# Patient Record
Sex: MALE | URBAN - METROPOLITAN AREA
[De-identification: names, ages, dates, MRNs, and addresses within clinical notes are randomized per-mention and may not be internally consistent; named-entity substitution may affect disease eponyms.]

---

## 2023-05-28 ENCOUNTER — WEB ENCOUNTER (OUTPATIENT)
Dept: URBAN - METROPOLITAN AREA CLINIC 12 | Facility: CLINIC | Age: 63
End: 2023-05-28

## 2023-05-31 ENCOUNTER — OFFICE VISIT (OUTPATIENT)
Dept: URBAN - METROPOLITAN AREA CLINIC 12 | Facility: CLINIC | Age: 63
End: 2023-05-31
Payer: COMMERCIAL

## 2023-05-31 ENCOUNTER — LAB OUTSIDE AN ENCOUNTER (OUTPATIENT)
Dept: URBAN - METROPOLITAN AREA CLINIC 12 | Facility: CLINIC | Age: 63
End: 2023-05-31

## 2023-05-31 VITALS
SYSTOLIC BLOOD PRESSURE: 134 MMHG | DIASTOLIC BLOOD PRESSURE: 86 MMHG | BODY MASS INDEX: 31.55 KG/M2 | WEIGHT: 213 LBS | HEART RATE: 55 BPM | HEIGHT: 69 IN

## 2023-05-31 DIAGNOSIS — R74.8 ELEVATED LIVER ENZYMES: ICD-10-CM

## 2023-05-31 DIAGNOSIS — D69.6 THROMBOCYTOPENIA: ICD-10-CM

## 2023-05-31 DIAGNOSIS — I10 PRIMARY HYPERTENSION: ICD-10-CM

## 2023-05-31 DIAGNOSIS — K76.0 FATTY LIVER: ICD-10-CM

## 2023-05-31 PROBLEM — 302215000: Status: ACTIVE | Noted: 2023-05-31

## 2023-05-31 PROBLEM — 162864005: Status: ACTIVE | Noted: 2023-05-31

## 2023-05-31 PROBLEM — 59621000: Status: ACTIVE | Noted: 2023-05-31

## 2023-05-31 PROBLEM — 197321007: Status: ACTIVE | Noted: 2023-05-31

## 2023-05-31 PROCEDURE — 99204 OFFICE O/P NEW MOD 45 MIN: CPT | Performed by: STUDENT IN AN ORGANIZED HEALTH CARE EDUCATION/TRAINING PROGRAM

## 2023-05-31 PROCEDURE — 99244 OFF/OP CNSLTJ NEW/EST MOD 40: CPT | Performed by: STUDENT IN AN ORGANIZED HEALTH CARE EDUCATION/TRAINING PROGRAM

## 2023-05-31 NOTE — HPI-TODAY'S VISIT:
This patient was referred by Dr. Mcnamara for an evaluation of elevated liver enzymes.  A copy of this will be sent to the referring provider. 62 yo M with obesity, HTN, pre-diabetes, HLD here for eval of elevated liver enzymes. Records reviewed. Labs with AST and ALT in 50s/60s. Normal bilirubin. Platelets of 112. CT abd pelvis -- heterogenous liver and mild splenomegaly. US with fatty liver.  No known FH of liver disease. He does not drink alcohol excessively, maybe 1 drink every 2 weeks No new medications.

## 2023-05-31 NOTE — PHYSICAL EXAM EYES:
Conjuntivae and eyelids appear normal Patient calling into clinic states he needs new prescriptions written since he has new insurance and will be having his scripts filled at a new pharmacy. Patient is requesting ACCU-CHEK DIONICIO PLUS test strip and  blood glucose monitoring (ACCU-CHEK FASTCLIX) lancets please send to [facility] in [place].       .Maya MORELAND    [facility] [place]

## 2023-06-08 ENCOUNTER — OFFICE VISIT (OUTPATIENT)
Dept: URBAN - METROPOLITAN AREA CLINIC 92 | Facility: CLINIC | Age: 63
End: 2023-06-08

## 2023-06-09 ENCOUNTER — DASHBOARD ENCOUNTERS (OUTPATIENT)
Age: 63
End: 2023-06-09

## 2024-12-15 ENCOUNTER — TELEPHONE ENCOUNTER (OUTPATIENT)
Dept: URBAN - METROPOLITAN AREA CLINIC 86 | Facility: CLINIC | Age: 64
End: 2024-12-15

## 2024-12-15 PROBLEM — 44054006: Status: ACTIVE | Noted: 2024-12-15

## 2024-12-15 PROBLEM — 59621000: Status: ACTIVE | Noted: 2024-12-15

## 2024-12-15 PROBLEM — 443371000124107: Status: ACTIVE | Noted: 2024-12-15

## 2024-12-15 PROBLEM — 267434003: Status: ACTIVE | Noted: 2024-12-15

## 2024-12-15 PROBLEM — 129679001: Status: ACTIVE | Noted: 2024-12-15

## 2024-12-15 NOTE — HPI-TODAY'S VISIT:
Patient is a 64-year-old male being referred in by Dr. Shayne Mcnamara for evaluation of suspected fatty liver. Copy the note with Dr. Becker provider. Several faxes of outside records were reviewed for this visit. The most recent fax dated December 5, 2024 showed labs that were done which included an alpha-fetoprotein is normal at 3.5, WBC 4.4 hemoglobin 14.3 hematocrit 20.46 MCV 82.4 platelet count low at 117 with normal being from 130 up to 100. October 16, 2024 WBC 4.3 hemoglobin 12.2 hematocrit 30.2 MCV 78.9 low platelet count low at 106.  AST 40 ALT 43 alk phos 99 albumin 3.7 bili was 0.76 sodium 141 potassium 3.5 creatinine 1.25 Estimated platelet count was even lower at 96,000 earlier around April 2024 with a hemoglobin 12.1 WBC 4.1.  Sodium 143 potassium 4.1 chloride 108 CO2 23 calcium 9.4 BUN 16 creatinine elevated slightly at 1.28 AST 59 ALT 51 alk phos 86 albumin 3.7 bili 0.78.   Early labs showed AST 29 ALT 30 alk phos 78 albumin 4.0 bilirubin elevated 1.  But not fractionated.  November 2024 ferritin was 11.1 which is just above the lower limit of normal.   October hemoglobin A1c 7.1. June 2024 hemoglobin A1c was 6.6. Hemoglobin A1c was high at 7.1% back in April. September 2024 lipase was elevated at 129. April 2024 cholesterol 132 glycerides 159 elevated HDL low at 35 and LDL 65.  PSA 0.277.  TSH 1.49. Past medical history suggest diabetes, hypertension, hypercholesterolemia, At his most recent October 16, 2024 visit he was reported to be working on his diet but had not been consistently following his carbohydrate restricted diet.  Exercising 3 times a week Hypertension was doing well. Metformin was increased to 1000 mg twice daily as A1c was up to 7.5.  LFTs were. Allergies were none. Family history heart disease in his father in his 50s with MI. Social history never smoker.  Occasional alcohol.  .  [employer].  2 children. Meds include lisinopril/hydrochlorothiazide 20/25 a day, Protonix 40 once a day, rosuvastatin 20 mg a day, Nitrostat as needed, sildenafil 20 mg every 24 hours as needed, metformin 500 mg which was increased to 1000 mg twice a day, Ozempic 0.5 mg a week which was discontinued.  Aspirin enteric-coated 300 and milligrams a day. Surgically wisdom teeth extraction and hemorrhoidectomy. Colonoscopy August 2015 with mild diverticulosis and internal hemorrhoids status post banding by Dr. Vines.  October visit weight was 2.7.1 height 69 inches which gives a BMI of 30.5 and a class I obesity. September 11, 2024 [facility] CT abdomen showed morphologic changes with prominence of the caudate lobe and mild diffuse nodularity of the liver contour most pain is in the right lobe.  Concerning for cirrhotic liver morphology.  Tiny subcentimeter hypodensity in the dome of the liver measuring 4 mm.  Likely cyst.  No suspicious anterior enhancing mass.  Subtle mildly hyperdense nodular focus at the dome of the left lobe measuring 1.3 cm.  No duct dilation.  Spleen 15.1 cm enlarged.  Adrenal glands unremarkable.  Mild, slightly pancreatic duct.  Kidneys enhance symmetrically.  No hydronephrosis.  There was mild nonspecific bilateral perinephric stranding.  Mild atherosclerotic plaque seen in abdominal aorta.  Hepatic veins and portal veins were patent.  Celiac, SMA and inferior mesenteric arteries patent.  Trace ascites seen in the lower abdominal mesentery.  Appendix was seen and unremarkable.  Stomach was decompressed but with diffuse wall thickening most pronounced in the gastric antrum.  Small amount of free fluid seen in the upper pelvis lower abdomen greater than typically seen for physiologic fluid.  Overall they felt that the liver was concerning for cirrhotic morphology with possible regenerative nodule at the dome of the left lobe which did not demonstrate active enhancement or washout.  The recommended AFP correlation and imaging with hepatic protocol MRI or CT.  Diffuse gastric wall thickening seen most pronounced at the gastric antrum they cannot relate gastric ulcer or lesion.  Apparent mild wall thickening of the sigmoid colon and rectum seen.  Diffuse bladder wall thickening also was noted.  Gallstones were seen. 1. Abnormal liver enzymes and with a known history of abnormal labs and suspicion of fatty liver in the past. Patient has multiple risk factors for this including type 2 diabetes, hyperlipidemia, class I obesity, and so secondarily needs to be followed. Also now with low platelets and imaging to suggest more advanced fibrosis. Needs MRI/MR elastography to assess same. Needs EGD to look for portal hypertension as well. Stomach with some thickening issues that were seen. Could represent gastropathy. Need full labs for MELD score. Reassess post above. 2. Fatty liver suspected in the past and may have advanced to cirrhosis is low platelets and enlarged spleen seen on last imaging. Some prominence of the caudate seen as well as mild diffuse nodularity so very suspicious for cirrhosis. Get MRI with fat quant and elastography to follow-up on same. Needs to work on risk factors and try to see what can be done to optimize diabetes hyperlipidemia weight issues. Previously was attempted to be on GLP-1 and unclear why off and needs to reestablish as the other MASH med is only for stage II-III so we are limited as to options for him. There are some emerging data that the GLP-1's not only help with the weight loss and diabetes but also with fatty liver inflammation and fibrosis. 3. Class I obesity to work on same and losing 5 to 10% can be quite helpful for this. 4. Type 2 diabetes noted and with rising sugar needs to be optimized. Need to revisit what happened with the GLP-1's why was her intolerance and with a different GLP-1 be more tolerable than another. I have seen some people fail 1 but tolerate another. 5. Mixed hyperlipidemia noted and appropriate to be on treatment as adds to fatty liver risk. Treating same also helps. 6. Essential hypertension history noted and as per team on treatment. 7. ED noted no treatment for same. 8. Vaccine counseling check for hep A/B and if negative consider vaccine. 9. Abn ct and suggests cirrhosis and assess same. 10. Thrombocytopenia and suspicion of cirrhosis as also enlarged spleen. Plan 1. Needs MRI/elastography. 2. Very suspicious that is not cirrhosis with the splenomegaly and the low platelets. 3. Rezdiffra is only arthritis for stage II-III and not stage IV and beyond. He could potentially look at the GLP-1 agents again which could be used in this circumstance however. They would help with the diabetes weight loss and lower the fat may even help progress fibrosis as that appears to be emerging data to support this. 4. Needs EGD to look at the stomach issues that were seen and a colonoscopy. Previously saw Dr. Alcazar and Dr. Vines.Duration of visit was minutes total with minutes spent preparing chart, and loading information into [facility] with additional minutes spent for this face to face/TeleMed visit with time spent reviewing patient's chart, notes, labs and discussing treatment plan with time spent discussing plans with pt.

## 2024-12-18 ENCOUNTER — LAB OUTSIDE AN ENCOUNTER (OUTPATIENT)
Dept: URBAN - METROPOLITAN AREA CLINIC 86 | Facility: CLINIC | Age: 64
End: 2024-12-18

## 2024-12-18 ENCOUNTER — OFFICE VISIT (OUTPATIENT)
Dept: URBAN - METROPOLITAN AREA CLINIC 86 | Facility: CLINIC | Age: 64
End: 2024-12-18
Payer: COMMERCIAL

## 2024-12-18 VITALS
HEIGHT: 69 IN | SYSTOLIC BLOOD PRESSURE: 133 MMHG | DIASTOLIC BLOOD PRESSURE: 76 MMHG | TEMPERATURE: 96.2 F | HEART RATE: 65 BPM | BODY MASS INDEX: 31.25 KG/M2 | WEIGHT: 211 LBS

## 2024-12-18 DIAGNOSIS — R74.8 ABNORMAL LIVER ENZYMES: ICD-10-CM

## 2024-12-18 DIAGNOSIS — E66.811 CLASS 1 OBESITY: ICD-10-CM

## 2024-12-18 DIAGNOSIS — K76.0 FATTY LIVER: ICD-10-CM

## 2024-12-18 DIAGNOSIS — I10 ESSENTIAL HYPERTENSION: ICD-10-CM

## 2024-12-18 DIAGNOSIS — N52.9: ICD-10-CM

## 2024-12-18 DIAGNOSIS — D69.6 THROMBOCYTOPENIA: ICD-10-CM

## 2024-12-18 DIAGNOSIS — R93.89 ABNORMAL CT SCAN: ICD-10-CM

## 2024-12-18 DIAGNOSIS — Z71.89 VACCINE COUNSELING: ICD-10-CM

## 2024-12-18 DIAGNOSIS — E11.69 TYPE 2 DIABETES MELLITUS WITH OTHER SPECIFIED COMPLICATION, WITHOUT LONG-TERM CURRENT USE OF INSULIN: ICD-10-CM

## 2024-12-18 DIAGNOSIS — E78.2 MIXED HYPERLIPIDEMIA: ICD-10-CM

## 2024-12-18 PROCEDURE — 99215 OFFICE O/P EST HI 40 MIN: CPT

## 2024-12-18 PROCEDURE — 99245 OFF/OP CONSLTJ NEW/EST HI 55: CPT

## 2024-12-18 RX ORDER — ASPIRIN 81 MG/1
1 TABLET TABLET, COATED ORAL ONCE A DAY
Status: ACTIVE | COMMUNITY

## 2024-12-18 RX ORDER — LISINOPRIL AND HYDROCHLOROTHIAZIDE 20; 25 MG/1; MG/1
1 TABLET TABLET ORAL ONCE A DAY
Status: ACTIVE | COMMUNITY

## 2024-12-18 RX ORDER — SILDENAFIL 20 MG/1
1 TABLET TABLET, FILM COATED ORAL ONCE A DAY
Status: ACTIVE | COMMUNITY

## 2024-12-18 RX ORDER — SEMAGLUTIDE 0.68 MG/ML
AS DIRECTED INJECTION, SOLUTION SUBCUTANEOUS
Status: ACTIVE | COMMUNITY

## 2024-12-18 RX ORDER — METFORMIN HYDROCHLORIDE 500 MG/1
1 TABLET WITH A MEAL TABLET, FILM COATED ORAL ONCE A DAY
Status: ACTIVE | COMMUNITY

## 2024-12-18 RX ORDER — ROSUVASTATIN CALCIUM 20 MG/1
1 TABLET TABLET, COATED ORAL ONCE A DAY
Status: ACTIVE | COMMUNITY

## 2024-12-18 RX ORDER — NITROGLYCERIN 0.4 MG/1
1 TABLET UNDER THE TONGUE AND ALLOW TO DISSOLVE AS NEEDED. TAKE EVERY 5 MINUTES UP TO 3 TIMES IF CHEST PAIN PERSISTS TABLET SUBLINGUAL THREE TIMES A DAY
Status: ACTIVE | COMMUNITY

## 2024-12-18 NOTE — EXAM-PHYSICAL EXAM
Gen: awake and responsive.   Eyes: anicteric, normal lids.   Mouth: normal lips.   Nose: no drainage.   Hearing: intact grossly.   Neck: trachea midline and no jvd.   CV: RRR no s3.   Lungs: clear. No wheezes,   Abd: Soft, nabs, NR, NT., mildly obese. Liver firm and spleen tip trace palpable.    Ext:  no leg edema, some palm erythema.   Neuro: moves all 4 ext grossly.

## 2024-12-26 ENCOUNTER — TELEPHONE ENCOUNTER (OUTPATIENT)
Dept: URBAN - METROPOLITAN AREA CLINIC 86 | Facility: CLINIC | Age: 64
End: 2024-12-26

## 2024-12-26 LAB
A/G RATIO: 1.7
ABSOLUTE BASOPHILS: 21
ABSOLUTE EOSINOPHILS: 160
ABSOLUTE LYMPHOCYTES: 773
ABSOLUTE MONOCYTES: 307
ABSOLUTE NEUTROPHILS: 2940
ACTIN (SMOOTH MUSCLE) ANTIBODY (IGG): <20
ALBUMIN: 4
ALKALINE PHOSPHATASE: 75
ALPHA-1-ANTITRYPSIN (AAT) PHENOTYPE: (no result)
ALT (SGPT): 30
ANA SCREEN, IFA: POSITIVE
AST (SGOT): 31
BASOPHILS: 0.5
BILIRUBIN, TOTAL: 0.8
BUN/CREATININE RATIO: (no result)
BUN: 13
CALCIUM: 9.4
CARBON DIOXIDE, TOTAL: 31
CERULOPLASMIN: 20
CHLORIDE: 103
CREATININE: 1.13
EGFR: 73
EOSINOPHILS: 3.8
FERRITIN, SERUM: 14
GLOBULIN, TOTAL: 2.3
GLUCOSE: 105
HEMATOCRIT: 41.6
HEMOGLOBIN: 12.9
HEPATITIS A AB, TOTAL: REACTIVE
HEPATITIS B SURFACE AB IMMUNITY, QN: <5
IRON BIND.CAP.(TIBC): 474
IRON SATURATION: 36
IRON: 170
LYMPHOCYTES: 18.4
MCH: 24.8
MCHC: 31
MCV: 80
MONOCYTES: 7.3
MPV: 12
NEUTROPHILS: 70
PLATELET COUNT: 118
POTASSIUM: 3.7
PROTEIN, TOTAL: 6.3
RDW: 14.5
RED BLOOD CELL COUNT: 5.2
SODIUM: 142
WHITE BLOOD CELL COUNT: 4.2

## 2024-12-26 NOTE — HPI-TODAY'S VISIT:
Dear William Oppenheimer, Dec 18: labs show iron sat 36% normal. Ceruloplasmin normal at 20 and this is a screen for copper storage excess and good to see this is negative for that. Ferritin is a little low at 14 and that could be due to some absorptional issues or dietary changes in some and needs to be followed. Iron saturation was normal though.   Hepatitis B sAb immunity was not seen to be present and so you need to consider elective hepatitis B vaccine series. ASMA less than 20 and good to see this be negative. Alpha one was normal phenotype MM which is good to confirm.   Glucose 105 and normal range for nonfasting state. Bun 13 and cr 1.13 and na 142 and k 3.7 and cl 103 and co2 31 and ca 9.4 and total protein 6.3 and albumin 4.0 and tb 0.8 and alk 75 and ast 31 and alt 30. Ideal alt is less than 35.  October labs showed an AST of 59 and ALT of 51 so these labs are lower.   CHRISTOPHER positive and nonspecific and of low titer at a level of 40.  Results less than 40 are considered negative and results from 40 up to 80 are considered low antibody results and levels over 80 are considered high antibody.  The pattern seen was called a cytoplasmic antibody that could be caused by other immune antibodies triggering this and is of uncertain clinical significance.  A second pattern also at a level of 40 for the CHRISTOPHER was seen which was nuclear/homogeneous which can in some cases be associated with SLE, drug-induced lupus, and juvenile idiopathic arthritis.  About 40% of people with fatty liver can have low-level nonspecific antibodies form in association with her fatty liver so it is possible that this low level CHRISTOPHER positivity seen in you is related to that.  If that is the case when we check an IgG and IgM quantitative that should be normal and we should also over time see these results get better as the fatty liver improves.   Wbc 4.2 and hg 12.9 a little low and hct 41.6 and mcv 80 and Platelets 118 also low.  Prior December 5 labs showed a platelet count of 117 and your hemoglobin was a little higher at 14.3. Neutrophils were normal at 2940 but lymphocytes a little low at 773.   Hep A immunity was seen and you are protected against that.   Very important that you continue to work with your primary provider on addressing your fatty liver risk factors including getting that A1c to be best possible for you, as well as improving your hyperlipidemia and weight issues and monitoring this course over time.   Due to the low platelets we definitely need that MRI and MRI elastography to ascertain the degree of fibrosis and the degree of liver fat that is present. That will help stage the degree of damage.   Please let us know if you are having any trouble getting that appointment scheduled. Dr. Bonilla

## 2025-01-15 ENCOUNTER — TELEPHONE ENCOUNTER (OUTPATIENT)
Dept: URBAN - METROPOLITAN AREA CLINIC 86 | Facility: CLINIC | Age: 65
End: 2025-01-15

## 2025-01-15 NOTE — HPI-TODAY'S VISIT:
Dear William Oppenheimer,  December 5, 2024 [facility] labs were sent 10 to us.  Your AFP was normal at 3.5 and WBC 4.4 hemoglobin was 14.3 hematocrit 44.6 MCV 82.4 and plate count a little low at 117. Your October 2024 labs showed WBC 4.3 hemoglobin 12.2 MCV 78.9 and platelet count 106. December 5 thousand 24 sodium was 143 potassium 4.1 chloride 108 which is a little up CO2 23 glucose 113 calcium 9.4 BUN 16 creatinine 1.28 AST 59 ALT 51 alk phos 86 albumin 3.7 bilirubin 0.78. September 2024 labs showed AST 29 ALT 30 alk phos 78 bilirubin 1.26 so clearly a bit of a difference here at the labs.  Unclear why the liver labs went up?  Please let us know if  you started some new medicine or something else?  June 2024 labs showed AST 39 ALT 36 alk phos 78 bilirubin 0.75. We did see some labs that you dated back earlier November that showed an iron sat that was low at 11%. Please let us know what may be causing the liver labs to be higher? Dr. Bonilla

## 2025-01-16 ENCOUNTER — P2P PATIENT RECORD (OUTPATIENT)
Age: 65
End: 2025-01-16

## 2025-02-19 ENCOUNTER — OFFICE VISIT (OUTPATIENT)
Dept: URBAN - METROPOLITAN AREA CLINIC 86 | Facility: CLINIC | Age: 65
End: 2025-02-19

## 2025-03-03 ENCOUNTER — TELEPHONE ENCOUNTER (OUTPATIENT)
Dept: URBAN - METROPOLITAN AREA CLINIC 86 | Facility: CLINIC | Age: 65
End: 2025-03-03

## 2025-03-03 NOTE — HPI-TODAY'S VISIT:
Dear William L. Oppenheimer, March 2 MRI with elastography shows lower thorax normal. You do have some subjective evidence of fat deposition seen and they did see what appears to be a cirrhotic liver.  You do have some regenerative nodules that were seen but no arterially enhancing suspicious lesions. Very important to follow the mri in 6m again to check on this.   Your liver stiffness was clearly in the cirrhotic range at 7.5 kPa with values over 5,0 cut off for stage IV liver fibrosis/cirrhosis. Your liver fat was in the mild category at 9.1% which is from 6.5 up to 17.4%. Liver quantitative iron was normal at 0.38 which corresponds with no significant iron deposition.   You do have gallstones but without signs of acute cholecystitis. Your spleen is enlarged at 15.8 cm in craniocaudal dimension. Subcentimeter pancreatic cysts were seen measuring up to 4 mm in the pancreatic head/uncinate process and 6 mm in the pancreatic tail and felt to be likely IPMN's.  No main pancreatic duct dilation. These usually are followed again in 6m by mri also.   The adrenal glands, kidneys, gastrointestinal use, lymph node views were normal. You did have a recanalized umbilical vein with small perigastric varices and a patent portal vein.  Very important you stay on top of EGD surveillance with this history.   Peritoneal and retroperitoneal views show trace ascites and very important for you to be on a low-salt diet with this history.  If the fluid worsens we will need to talk about more medicines. Bone and soft tissue views were normal.   In summary, cirrhotic liver was seen with stigmata of portal hypertension but no suspicious lesions in liver fat was seen in the mild category at 9.1%.  Liver stiffness was 7.5K PA which is in the stage IV criteria. We will go over your results in detail with you at your visit.   As you recall, from your prior labs that you sent to us from December you did have a low platelet count was short suspicious for cirrhosis to be present.  You had the known splenomegaly as well.   Your providers also had the suspicion that you had advanced cirrhosis due to your low platelets and the enlarged spleen that was seen in this MRI was being done to confirm that.  If you were at stage IV we knew that you could not do the new Alfaro drug which is the case but that does not mean that you cannot be on a GLP-1 to treat your type 2 diabetes and be optimized on that aspect and you are doing that as of your last visit in December.   Very important to get that EGD done that we talked about as well.   Could not get you tonight so we will discuss this at your upcoming visit on March 7 in detail. Dr. Bonilla

## 2025-03-07 ENCOUNTER — OFFICE VISIT (OUTPATIENT)
Dept: URBAN - METROPOLITAN AREA CLINIC 86 | Facility: CLINIC | Age: 65
End: 2025-03-07
Payer: COMMERCIAL

## 2025-03-07 ENCOUNTER — LAB OUTSIDE AN ENCOUNTER (OUTPATIENT)
Dept: URBAN - METROPOLITAN AREA CLINIC 86 | Facility: CLINIC | Age: 65
End: 2025-03-07

## 2025-03-07 VITALS
SYSTOLIC BLOOD PRESSURE: 128 MMHG | HEIGHT: 69 IN | WEIGHT: 214 LBS | HEART RATE: 76 BPM | BODY MASS INDEX: 31.7 KG/M2 | DIASTOLIC BLOOD PRESSURE: 81 MMHG | TEMPERATURE: 97.7 F

## 2025-03-07 DIAGNOSIS — K76.6 PORTAL HYPERTENSION: ICD-10-CM

## 2025-03-07 DIAGNOSIS — R74.8 ABNORMAL LIVER ENZYMES: ICD-10-CM

## 2025-03-07 DIAGNOSIS — K76.0 FATTY LIVER: ICD-10-CM

## 2025-03-07 DIAGNOSIS — I10 ESSENTIAL HYPERTENSION: ICD-10-CM

## 2025-03-07 DIAGNOSIS — E66.811 CLASS 1 OBESITY: ICD-10-CM

## 2025-03-07 DIAGNOSIS — D69.6 THROMBOCYTOPENIA: ICD-10-CM

## 2025-03-07 DIAGNOSIS — N52.9: ICD-10-CM

## 2025-03-07 DIAGNOSIS — R93.89 ABNORMAL CT SCAN: ICD-10-CM

## 2025-03-07 DIAGNOSIS — R76.9 ABNORMAL IMMUNOLOGICAL FINDING IN SERUM, UNSPECIFIED: ICD-10-CM

## 2025-03-07 DIAGNOSIS — K76.89 LIVER NODULE: ICD-10-CM

## 2025-03-07 DIAGNOSIS — E11.69 TYPE 2 DIABETES MELLITUS WITH OTHER SPECIFIED COMPLICATION, WITHOUT LONG-TERM CURRENT USE OF INSULIN: ICD-10-CM

## 2025-03-07 DIAGNOSIS — R18.8 ASCITES: ICD-10-CM

## 2025-03-07 DIAGNOSIS — Z71.89 VACCINE COUNSELING: ICD-10-CM

## 2025-03-07 DIAGNOSIS — E78.2 MIXED HYPERLIPIDEMIA: ICD-10-CM

## 2025-03-07 PROCEDURE — 99215 OFFICE O/P EST HI 40 MIN: CPT

## 2025-03-07 RX ORDER — LISINOPRIL AND HYDROCHLOROTHIAZIDE 20; 25 MG/1; MG/1
1 TABLET TABLET ORAL ONCE A DAY
Status: ACTIVE | COMMUNITY

## 2025-03-07 RX ORDER — ASPIRIN 81 MG/1
1 TABLET TABLET, COATED ORAL ONCE A DAY
Status: ACTIVE | COMMUNITY

## 2025-03-07 RX ORDER — NITROGLYCERIN 0.4 MG/1
1 TABLET UNDER THE TONGUE AND ALLOW TO DISSOLVE AS NEEDED. TAKE EVERY 5 MINUTES UP TO 3 TIMES IF CHEST PAIN PERSISTS TABLET SUBLINGUAL THREE TIMES A DAY
Status: ACTIVE | COMMUNITY

## 2025-03-07 RX ORDER — ROSUVASTATIN CALCIUM 20 MG/1
1 TABLET TABLET, COATED ORAL ONCE A DAY
Status: ACTIVE | COMMUNITY

## 2025-03-07 RX ORDER — SEMAGLUTIDE 0.68 MG/ML
AS DIRECTED INJECTION, SOLUTION SUBCUTANEOUS
Status: DISCONTINUED | COMMUNITY

## 2025-03-07 RX ORDER — SEMAGLUTIDE 1.34 MG/ML
AS DIRECTED INJECTION, SOLUTION SUBCUTANEOUS
Status: ACTIVE | COMMUNITY

## 2025-03-07 RX ORDER — SILDENAFIL 20 MG/1
1 TABLET TABLET, FILM COATED ORAL ONCE A DAY
Status: ACTIVE | COMMUNITY

## 2025-03-07 RX ORDER — METFORMIN HYDROCHLORIDE 500 MG/1
1 TABLET WITH A MEAL TABLET, FILM COATED ORAL ONCE A DAY
Status: ACTIVE | COMMUNITY

## 2025-03-07 NOTE — HPI-TODAY'S VISIT:
Patient is a 64-year-old male seen Dec 2024  being referred in by Dr. Shayne Mcnamara for evaluation of suspected fatty liver and low platelets.  Copy the note with Dr. Becker provider.   Dec 18: labs show iron sat 36% normal.  Ceruloplasmin normal at 20 and this is a screen for copper storage excess and good to see this is negative for that.  Ferritin is a little low at 14 and that could be due to some absorptional issues or dietary changes in some and needs to be followed. Iron saturation was normal though.   Hepatitis B sAb immunity was not seen to be present and so you need to consider elective hepatitis B vaccine series.  ASMA less than 20 and good to see this be negative.  Alpha one was normal phenotype MM which is good to confirm.    Glucose 105 and normal range for nonfasting state. Bun 13 and cr 1.13 and na 142 and k 3.7 and cl 103 and co2 31 and ca 9.4 and total protein 6.3 and albumin 4.0 and tb 0.8 and alk 75 and ast 31 and alt 30. Ideal alt is less than 35. October labs showed an AST of 59 and ALT of 51 so these labs are lower.    CHRISTOPHER positive and nonspecific and of low titer at a level of 40. Results less than 40 are considered negative and results from 40 up to 80 are considered low antibody results and levels over 80 are considered high antibody. The pattern seen was called a cytoplasmic antibody that could be caused by other immune antibodies triggering this and is of uncertain clinical significance. A second pattern also at a level of 40 for the CHRISTOPHER was seen which was nuclear/homogeneous which can in some cases be associated with SLE, drug-induced lupus, and juvenile idiopathic arthritis. About 40% of people with fatty liver can have low-level nonspecific antibodies form in association with her fatty liver so it is possible that this low level CHRISTOPHER positivity seen in you is related to that. If that is the case when we check an IgG and IgM quantitative that should be normal and we should also over time see these results get better as the fatty liver improves.    Wbc 4.2 and hg 12.9 a little low and hct 41.6 and mcv 80 and Platelets 118 also low. Prior December 5 labs showed a platelet count of 117 and your hemoglobin was a little higher at 14.3.  Neutrophils were normal at 2940 but lymphocytes a little low at 773.    Hep A immunity was seen and you are protected against that.    Very important that you continue to work with your primary provider on addressing your fatty liver risk factors including getting that A1c to be best possible for you, as well as improving your hyperlipidemia and weight issues and monitoring this course over time.    Due to the low platelets we definitely need that MRI and MRI elastography to ascertain the degree of fibrosis and the degree of liver fat that is present. That will help stage the degree of damage.   March 2 MRI with elastography shows lower thorax normal. You do have some subjective evidence of fat deposition seen and they did see what appears to be a cirrhotic liver. You do have some regenerative nodules that were seen but no arterially enhancing suspicious lesions. Very important to follow the mri in 6m again to check on this. Your liver stiffness was clearly in the cirrhotic range at 7.5 kPa with values over 5,0 cut off for stage IV liver fibrosis/cirrhosis. Your liver fat was in the mild category at 9.1% which is from 6.5 up to 17.4%. Liver quantitative iron was normal at 0.38 which corresponds with no significant iron deposition. You do have gallstones but without signs of acute cholecystitis. Your spleen is enlarged at 15.8 cm in craniocaudal dimension. Subcentimeter pancreatic cysts were seen measuring up to 4 mm in the pancreatic head/uncinate process and 6 mm in the pancreatic tail and felt to be likely IPMN's. No main pancreatic duct dilation. These usually are followed again in 6m by mri also. The adrenal glands, kidneys, gastrointestinal use, lymph node views were normal. You did have a recanalized umbilical vein with small perigastric varices and a patent portal vein. Very important you stay on top of EGD surveillance with this history. Peritoneal and retroperitoneal views show trace ascites and very important for you to be on a low-salt diet with this history. If the fluid worsens we will need to talk about more medicines. Bone and soft tissue views were normal. In summary, cirrhotic liver was seen with stigmata of portal hypertension but no suspicious lesions in liver fat was seen in the mild category at 9.1%. Liver stiffness was 7.5K PA which is in the stage IV criteria.  Nasreen is approved stage 2-3 and not stage 4 plus (ascites and phtn).  Prior his providers also had the suspicion that you had advanced cirrhosis due to your low platelets and the enlarged spleen that was seen in this MRI was done to confirm that.   He can still do the ozempic and mounjaro and he is on the ozempic at 1,0 mg and he has some burping and gas. Not eating as much and down to 206-207 from 211.  Very important to get that EGD done that we talked about with the varices seen.   December 5, 2024 [facility] labs were sent in to us. Your AFP was normal at 3.5 and WBC 4.4 hemoglobin was 14.3 hematocrit 44.6 MCV 82.4 and platelet count a little low at 117.  Your October 2024 labs showed WBC 4.3 hemoglobin 12.2 MCV 78.9 and platelet count 106.  December 5 2024 sodium was 143 potassium 4.1 chloride 108 which is a little up CO2 23 glucose 113 calcium 9.4 BUN 16 creatinine 1.28 AST 59 ALT 51 alk phos 86 albumin 3.7 bilirubin 0.78.  September 2024 labs showed AST 29 ALT 30 alk phos 78 bilirubin 1.26 so clearly a bit of a difference here at the labs. Unclear why the liver labs went up? Please let us know if  you started some new medicine or something else?   June 2024 labs showed AST 39 ALT 36 alk phos 78 bilirubin 0.75.  We did see some labs that you dated back earlier November that showed an iron sat that was low at 11%.  Doing colon coming up and doing egd an to set up with Dr Johnson.  Fax dated December 5, 2024 showed labs that were done which included an alpha-fetoprotein is normal at 3.5, WBC 4.4 hemoglobin 14.3 hematocrit 20.4 MCV 82.4 platelet count low at 117 with normal being from 130 up to 100.  October 16, 2024 WBC 4.3 hemoglobin 12.2 hematocrit 30.2 MCV 78.9 low platelet count low at 106.  AST 40 ALT 43 alk phos 99 albumin 3.7 bili was 0.76 sodium 141 potassium 3.5 creatinine 1.25  Estimated platelet count was even lower at 96,000 earlier around April 2024 with a hemoglobin 12.1 WBC 4.1.  Sodium 143 potassium 4.1 chloride 108 CO2 23 calcium 9.4 BUN 16 creatinine elevated slightly at 1.28 AST 59 ALT 51 alk phos 86 albumin 3.7 bili 0.78.    Early labs showed AST 29 ALT 30 alk phos 78 albumin 4.0 bilirubin elevated 1.  But not fractionated.   November 2024 ferritin was 11.1 which is just above the lower limit of normal.    October hemoglobin A1c 7.1.  He had been on ozempic for while and we commented that stayed low dose 0.5 mg a weeks and had not gone up for the last several months and needed to and so he did due to some gi issues.   June 2024 hemoglobin A1c was 6.6.  Hemoglobin A1c was high at 7.1% back in April.  September 2024 lipase was elevated at 129.  April 2024 cholesterol 132 triglycerides 159 elevated HDL low at 35 and LDL 65.  PSA 0.277.  TSH 1.49.  RF weight, dm, hld, and working on this defintiely.   Past medical history suggest diabetes, hypertension, hypercholesterolemia,  At his most recent October 16, 2024 visit he was reported to be working on his diet but had not been consistently following his carbohydrate restricted diet.  Exercising 3 times a week  Hypertension was doing well.  Metformin was increased to 1000 mg twice daily as A1c was up to 7.5.  LFTs were.  Allergies were none.  Family history heart disease in his father in his 50s with MI.  Social history never smoker.  Occasional alcohol.  . [employer],  2 children.  Meds include lisinopril/hydrochlorothiazide 20/25 a day, Protonix 40 once a day, rosuvastatin 20 mg a day, Nitrostat as needed, sildenafil 20 mg every 24 hours as needed, metformin 500 mg which was increased to 1000 mg twice a day and 500mg po qhs, Ozempic 0.5 mg a week which was discontinued but nack on. Aspirin enteric-coated 300 and milligrams a day.  Surgically wisdom teeth extraction and hemorrhoidectomy.  Colonoscopy August 2015 with mild diverticulosis and internal hemorrhoids status post banding by Dr. Vines. Due 2025.  October visit weight was 207 height 69 inches which gives a BMI of 30.5 and a class I obesity.  September 11, 2024 [facility] CT abdomen showed morphologic changes with prominence of the caudate lobe and mild diffuse nodularity of the liver contour most pain is in the right lobe.  Concerning for cirrhotic liver morphology.  Tiny subcentimeter hypodensity in the dome of the liver measuring 4 mm.  Likely cyst.  No suspicious anterior enhancing mass.  Subtle mildly hyperdense nodular focus at the dome of the left lobe measuring 1.3 cm.  No duct dilation.  Spleen 15.1 cm enlarged.  Adrenal glands unremarkable.  Mild, slightly pancreatic duct.  Kidneys enhance symmetrically.  No hydronephrosis.  There was mild nonspecific bilateral perinephric stranding.  Mild atherosclerotic plaque seen in abdominal aorta.  Hepatic veins and portal veins were patent.  Celiac, SMA and inferior mesenteric arteries patent.  Trace ascites seen in the lower abdominal mesentery.  Appendix was seen and unremarkable.  Stomach was decompressed but with diffuse wall thickening most pronounced in the gastric antrum.  Small amount of free fluid seen in the upper pelvis lower abdomen greater than typically seen for physiologic fluid.  Overall they felt that the liver was concerning for cirrhotic morphology with possible regenerative nodule at the dome of the left lobe which did not demonstrate active enhancement or washout.  The recommended AFP correlation and imaging with hepatic protocol MRI or CT.  Diffuse gastric wall thickening seen most pronounced at the gastric antrum they cannot relate gastric ulcer or lesion.  Apparent mild wall thickening of the sigmoid colon and rectum seen.  Diffuse bladder wall thickening also was noted.  Gallstones were seen.  Plan  1.  Needs MRI in sept to look at nodules. 2.  Need that egd to look for varices as varices near the stomach 3. Need to be low salt diet less than 2 gm for small ascites.  4. Pt is not candidate rezdiffra 4 plus on issues but can do glp1 and they have date start=ing to emerge to help stage 2/3 but nto out on 4 yet and that is encouraging. 5. Need to do meld score. 6. See how doing in 3m.    Duration of visit was 55 minutes total with 15  minutes spent preparing chart, and loading information into [facility] with additional 40  minutes spent for this face to face visit with time spent reviewing patient's chart, notes, labs and discussing treatment plan with time spent discussing plans with pt.

## 2025-03-10 ENCOUNTER — TELEPHONE ENCOUNTER (OUTPATIENT)
Dept: URBAN - METROPOLITAN AREA CLINIC 86 | Facility: CLINIC | Age: 65
End: 2025-03-10

## 2025-03-10 LAB
A/G RATIO: 1.7
ABSOLUTE BASOPHILS: 32
ABSOLUTE EOSINOPHILS: 120
ABSOLUTE LYMPHOCYTES: 869
ABSOLUTE MONOCYTES: 405
ABSOLUTE NEUTROPHILS: 3174
ALBUMIN: 3.8
ALKALINE PHOSPHATASE: 74
ALT (SGPT): 37
AST (SGOT): 38
BASOPHILS: 0.7
BILIRUBIN, TOTAL: 0.8
BUN/CREATININE RATIO: (no result)
BUN: 12
CALCIUM: 9.2
CARBON DIOXIDE, TOTAL: 27
CHLORIDE: 104
CREATININE: 1.17
EGFR: 70
EOSINOPHILS: 2.6
GLOBULIN, TOTAL: 2.3
GLUCOSE: 93
HEMATOCRIT: 41.1
HEMOGLOBIN: 13.9
IMMUNOGLOBULIN G, QN, SERUM: 978
IMMUNOGLOBULIN M: 34
INR: 1.2
LYMPHOCYTES: 18.9
MCH: 26.7
MCHC: 33.8
MCV: 78.9
MONOCYTES: 8.8
MPV: 11.9
NEUTROPHILS: 69
PLATELET COUNT: 113
POTASSIUM: 3.7
PROTEIN, TOTAL: 6.1
PT: 12.3
RDW: 14.3
RED BLOOD CELL COUNT: 5.21
SODIUM: 141
WHITE BLOOD CELL COUNT: 4.6

## 2025-03-10 NOTE — HPI-TODAY'S VISIT:
Dear William Oppenheimer, March 7 IGG normal 978 and and IGM little low 34 and normal  and please share with primary provider to have you see a blood specialist as you have a slightly low immunoglobulin IGM and that is not infrequently seen as part of the work ups we do. The total IGG is normal but only IGM is a little low at 34. This speaks to not an immune driven liver issue. Glucose 93 and bun 12 and cr 1,17 and na 141 and k 3,.7 and cl 104 and co2 27 and ca 9.2 and total protein 6.1 and albumin 3,8 and tb 0.8 and alk 74 and ast 38 little up and alt 37 with ideal alt less than 35. Prior ast 31 and alt 30 so little up on those. Wbc 4.6 normal and hg 13.9 normal now form 12.9 prior. MCV lower though 78.9 and mch little low 26.7 but up from 24.8 in December and please share with local provider. Platelets count remains low 113 and prior 118. Neutrophils normal 3174 and lymphs normal 869 and inr 1.2. Meld 10/10/9 and scores less than 15 are consider low. We will send to Dr Mcnamara to be aware of the IGM being little low. Curiously iron was 36%. Dr Bonilla

## 2025-03-24 ENCOUNTER — OFFICE VISIT (OUTPATIENT)
Dept: URBAN - METROPOLITAN AREA CLINIC 86 | Facility: CLINIC | Age: 65
End: 2025-03-24

## 2025-04-17 ENCOUNTER — P2P PATIENT RECORD (OUTPATIENT)
Age: 65
End: 2025-04-17

## 2025-06-06 ENCOUNTER — OFFICE VISIT (OUTPATIENT)
Dept: URBAN - METROPOLITAN AREA CLINIC 86 | Facility: CLINIC | Age: 65
End: 2025-06-06
Payer: COMMERCIAL

## 2025-06-06 DIAGNOSIS — K76.0 FATTY LIVER: ICD-10-CM

## 2025-06-06 DIAGNOSIS — N52.9: ICD-10-CM

## 2025-06-06 DIAGNOSIS — D69.6 THROMBOCYTOPENIA: ICD-10-CM

## 2025-06-06 DIAGNOSIS — R76.9 ABNORMAL IMMUNOLOGICAL FINDING IN SERUM, UNSPECIFIED: ICD-10-CM

## 2025-06-06 DIAGNOSIS — E66.811 CLASS 1 OBESITY: ICD-10-CM

## 2025-06-06 DIAGNOSIS — K76.6 PORTAL HYPERTENSION: ICD-10-CM

## 2025-06-06 DIAGNOSIS — K76.89 LIVER NODULE: ICD-10-CM

## 2025-06-06 DIAGNOSIS — R93.89 ABNORMAL CT SCAN: ICD-10-CM

## 2025-06-06 DIAGNOSIS — E11.69 TYPE 2 DIABETES MELLITUS WITH OTHER SPECIFIED COMPLICATION, WITHOUT LONG-TERM CURRENT USE OF INSULIN: ICD-10-CM

## 2025-06-06 DIAGNOSIS — Z71.89 VACCINE COUNSELING: ICD-10-CM

## 2025-06-06 DIAGNOSIS — R18.8 ASCITES: ICD-10-CM

## 2025-06-06 DIAGNOSIS — R74.8 ABNORMAL LIVER ENZYMES: ICD-10-CM

## 2025-06-06 DIAGNOSIS — E78.2 MIXED HYPERLIPIDEMIA: ICD-10-CM

## 2025-06-06 DIAGNOSIS — I10 ESSENTIAL HYPERTENSION: ICD-10-CM

## 2025-06-06 PROCEDURE — 99215 OFFICE O/P EST HI 40 MIN: CPT

## 2025-06-06 RX ORDER — LISINOPRIL AND HYDROCHLOROTHIAZIDE 20; 25 MG/1; MG/1
1 TABLET TABLET ORAL ONCE A DAY
Status: ACTIVE | COMMUNITY

## 2025-06-06 RX ORDER — NITROGLYCERIN 0.4 MG/1
1 TABLET UNDER THE TONGUE AND ALLOW TO DISSOLVE AS NEEDED. TAKE EVERY 5 MINUTES UP TO 3 TIMES IF CHEST PAIN PERSISTS TABLET SUBLINGUAL THREE TIMES A DAY
Status: ACTIVE | COMMUNITY

## 2025-06-06 RX ORDER — ASPIRIN 81 MG/1
1 TABLET TABLET, COATED ORAL ONCE A DAY
Status: ACTIVE | COMMUNITY

## 2025-06-06 RX ORDER — ROSUVASTATIN CALCIUM 20 MG/1
1 TABLET TABLET, COATED ORAL ONCE A DAY
Status: ACTIVE | COMMUNITY

## 2025-06-06 RX ORDER — SEMAGLUTIDE 2.68 MG/ML
AS DIRECTED INJECTION, SOLUTION SUBCUTANEOUS
Status: ACTIVE | COMMUNITY

## 2025-06-06 RX ORDER — SILDENAFIL 20 MG/1
1 TABLET TABLET, FILM COATED ORAL ONCE A DAY
Status: ACTIVE | COMMUNITY

## 2025-06-06 RX ORDER — METFORMIN HYDROCHLORIDE 500 MG/1
1 TABLET WITH A MEAL TABLET, FILM COATED ORAL ONCE A DAY
Status: ACTIVE | COMMUNITY

## 2025-06-06 NOTE — HPI-TODAY'S VISIT:
Patient is a 65-year-old male seen March 2025  being referred in by Dr. Shayne Mcnamara for evaluation of suspected fatty liver and low platelets.  Copy the note with Dr. Becker provider.  He has lost some weight 10 poiunds.  He went to Dr Mcnamara and he may have done labs.  He is doing the diet and exericse and ozempic 2.0mg. Essence trial 96 weeks 32% of pts with more advanced fibrosis 1 stage or more and 60% or so lost parmar inflammation.  March 7 IGG normal 978 and and IGM little low 34 and normal  and please share with primary provider to have you see a blood specialist as you have a slightly low immunoglobulin IGM and that is not infrequently seen as part of the work ups we do. The total IGG is normal but only IGM is a little low at 34. This speaks to not an immune driven liver issue. Glucose 93 and bun 12 and cr 1,17 and na 141 and k 3,.7 and cl 104 and co2 27 and ca 9.2 and total protein 6.1 and albumin 3,8 and tb 0.8 and alk 74 and ast 38 little up and alt 37 with ideal alt less than 35. Prior ast 31 and alt 30 so little up on those. Wbc 4.6 normal and hg 13.9 normal now form 12.9 prior. MCV lower though 78.9 and mch little low 26.7 but up from 24.8 in December and please share with local provider. Platelets count remains low 113 and prior 118. Neutrophils normal 3174 and lymphs normal 869 and inr 1.2.  Meld 10/10/9 and scores less than 15 are consider low.  We do send to Dr Mcnamara to be aware of the IGM being little low. Curiously iron was 36%.   Dec 18: labs show iron sat 36% normal. Ceruloplasmin normal at 20 and this is a screen for copper storage excess and good to see this is negative for that. Ferritin is a little low at 14 and that could be due to some absorptional issues or dietary changes in some and needs to be followed. Iron saturation was normal though. Hepatitis B sAb immunity was not seen to be present and so you need to consider elective hepatitis B vaccine series. Has not done hep b vaccine. ASMA less than 20 and good to see this be negative. Alpha one was normal phenotype MM which is good to confirm. Glucose 105 and normal range for nonfasting state. Bun 13 and cr 1.13 and na 142 and k 3.7 and cl 103 and co2 31 and ca 9.4 and total protein 6.3 and albumin 4.0 and tb 0.8 and alk 75 and ast 31 and alt 30. Ideal alt is less than 35. October labs showed an AST of 59 and ALT of 51 so these labs are lower.  CHRISTOPHER positive and nonspecific and of low titer at a level of 40. Results less than 40 are considered negative and results from 40 up to 80 are considered low antibody results and levels over 80 are considered high antibody. The pattern seen was called a cytoplasmic antibody that could be caused by other immune antibodies triggering this and is of uncertain clinical significance. A second pattern also at a level of 40 for the CHRISTOPHER was seen which was nuclear/homogeneous which can in some cases be associated with SLE, drug-induced lupus, and juvenile idiopathic arthritis. About 40% of people with fatty liver can have low-level nonspecific antibodies form in association with her fatty liver so it is possible that this low level CHRISTOPHER positivity seen in you is related to that. If that is the case when we check an IgG and IgM quantitative that should be normal and we should also over time see these results get better as the fatty liver improves.  Wbc 4.2 and hg 12.9 a little low and hct 41.6 and mcv 80 and Platelets 118 also low. Prior December 5 labs showed a platelet count of 117 and your hemoglobin was a little higher at 14.3. Neutrophils were normal at 2940 but lymphocytes a little low at 773. Hep A immunity was seen and you are protected against that.  Very important that you continue to work with your primary provider on addressing your fatty liver risk factors including getting that A1c to be best possible for you, as well as improving your hyperlipidemia and weight issues and monitoring this course over time.  March 2 2025  MRI with elastography shows lower thorax normal. You do have some subjective evidence of fat deposition seen and they did see what appears to be a cirrhotic liver. You do have some regenerative nodules that were seen but no arterially enhancing suspicious lesions. Very important to follow the mri in 6m again to check on this. Your liver stiffness was clearly in the cirrhotic range at 7.5 kPa with values over 5.0 cut off for stage IV liver fibrosis/cirrhosis. Your liver fat was in the mild category at 9.1% which is from 6.5 up to 17.4%. Liver quantitative iron was normal at 0.38 which corresponds with no significant iron deposition. You do have gallstones but without signs of acute cholecystitis. Your spleen is enlarged at 15.8 cm in craniocaudal dimension. Subcentimeter pancreatic cysts were seen measuring up to 4 mm in the pancreatic head/uncinate process and 6 mm in the pancreatic tail and felt to be likely IPMN's. No main pancreatic duct dilation. These usually are followed again in 6m by mri also. The adrenal glands, kidneys, gastrointestinal use, lymph node views were normal. You did have a recanalized umbilical vein with small perigastric varices and a patent portal vein. Very important you stay on top of EGD surveillance with this history. Peritoneal and retroperitoneal views show trace ascites and very important for you to be on a low-salt diet with this history. If the fluid worsens we will need to talk about more medicines. Bone and soft tissue views were normal. In summary, cirrhotic liver was seen with stigmata of portal hypertension but no suspicious lesions in liver fat was seen in the mild category at 9.1%. Liver stiffness was 7.5K PA which is in the stage IV criteria.  Nasreen is approved stage 2-3 and not stage 4 plus (ascites and phtn).  Prior his providers also had the suspicion that you had advanced cirrhosis due to your low platelets and the enlarged spleen that was seen in this MRI was done to confirm that.   He can still do the ozempic and mounjaro and he is on the ozempic at 1,0 mg and he has some burping and gas. Not eating as much and down to 16 from 206-207 from 211.  Very important to get that EGD done that we talked about with the varices seen.  Needs to do the egd.  December 5, 2024 [facility] labs were sent in to us. Your AFP was normal at 3.5 and WBC 4.4 hemoglobin was 14.3 hematocrit 44.6 MCV 82.4 and platelet count a little low at 117.  Your October 2024 labs showed WBC 4.3 hemoglobin 12.2 MCV 78.9 and platelet count 106.  December 5 2024 sodium was 143 potassium 4.1 chloride 108 which is a little up CO2 23 glucose 113 calcium 9.4 BUN 16 creatinine 1.28 AST 59 ALT 51 alk phos 86 albumin 3.7 bilirubin 0.78.  September 2024 labs showed AST 29 ALT 30 alk phos 78 bilirubin 1.26 so clearly a bit of a difference here at the labs. Unclear why the liver labs went up? Please let us know if  you started some new medicine or something else?   June 2024 labs showed AST 39 ALT 36 alk phos 78 bilirubin 0.75.  We did see some labs that you dated back earlier November that showed an iron sat that was low at 11%.  Doing colon coming up and doing egd an to set up with Dr Johnson.  Fax dated December 5, 2024 showed labs that were done which included an alpha-fetoprotein is normal at 3.5, WBC 4.4 hemoglobin 14.3 hematocrit 20.4 MCV 82.4 platelet count low at 117 with normal being from 130 up to 100.  October 16, 2024 WBC 4.3 hemoglobin 12.2 hematocrit 30.2 MCV 78.9 low platelet count low at 106.  AST 40 ALT 43 alk phos 99 albumin 3.7 bili was 0.76 sodium 141 potassium 3.5 creatinine 1.25  Estimated platelet count was even lower at 96,000 earlier around April 2024 with a hemoglobin 12.1 WBC 4.1.  Sodium 143 potassium 4.1 chloride 108 CO2 23 calcium 9.4 BUN 16 creatinine elevated slightly at 1.28 AST 59 ALT 51 alk phos 86 albumin 3.7 bili 0.78.    Early labs showed AST 29 ALT 30 alk phos 78 albumin 4.0 bilirubin elevated 1.  But not fractionated.   November 2024 ferritin was 11.1 which is just above the lower limit of normal.    October hemoglobin A1c 7.1.  He had been on ozempic for while and we commented that stayed low dose 0.5 mg a weeks and had not gone up for the last several months and needed to and so he did due to some gi issues.   June 2024 hemoglobin A1c was 6.6.  Hemoglobin A1c was high at 7.1% back in April.  September 2024 lipase was elevated at 129.  April 2024 cholesterol 132 triglycerides 159 elevated HDL low at 35 and LDL 65.  PSA 0.277.  TSH 1.49.  RF weight, dm, hld, and working on this defintiely.   Past medical history suggest diabetes, hypertension, hypercholesterolemia,  At his most recent October 16, 2024 visit he was reported to be working on his diet but had not been consistently following his carbohydrate restricted diet.  Exercising 3 times a week  Hypertension was doing well.  Metformin was increased to 1000 mg twice daily as A1c was up to 7.5.  LFTs were.  Allergies were none.  Family history heart disease in his father in his 50s with MI.  Social history never smoker.  Occasional alcohol.  . [employer],  2 children.  Meds include lisinopril/hydrochlorothiazide 20/25 a day, Protonix 40 once a day, rosuvastatin 20 mg a day, Nitrostat as needed, sildenafil 20 mg every 24 hours as needed, metformin 500 mg which was increased to 1000 mg twice a day and 500mg po qhs, Ozempic 0.5 mg a week which was discontinued but nack on. Aspirin enteric-coated 300 and milligrams a day.  Surgically wisdom teeth extraction and hemorrhoidectomy.  Colonoscopy August 2015 with mild diverticulosis and internal hemorrhoids status post banding by Dr. Vines. Due 2025.  October visit weight was 207 height 69 inches which gives a BMI of 30.5 and a class I obesity.  September 11, 2024 [facility] CT abdomen showed morphologic changes with prominence of the caudate lobe and mild diffuse nodularity of the liver contour most pain is in the right lobe.  Concerning for cirrhotic liver morphology.  Tiny subcentimeter hypodensity in the dome of the liver measuring 4 mm.  Likely cyst.  No suspicious anterior enhancing mass.  Subtle mildly hyperdense nodular focus at the dome of the left lobe measuring 1.3 cm.  No duct dilation.  Spleen 15.1 cm enlarged.  Adrenal glands unremarkable.  Mild, slightly pancreatic duct.  Kidneys enhance symmetrically.  No hydronephrosis.  There was mild nonspecific bilateral perinephric stranding.  Mild atherosclerotic plaque seen in abdominal aorta.  Hepatic veins and portal veins were patent.  Celiac, SMA and inferior mesenteric arteries patent.  Trace ascites seen in the lower abdominal mesentery.  Appendix was seen and unremarkable.  Stomach was decompressed but with diffuse wall thickening most pronounced in the gastric antrum.  Small amount of free fluid seen in the upper pelvis lower abdomen greater than typically seen for physiologic fluid.  Overall they felt that the liver was concerning for cirrhotic morphology with possible regenerative nodule at the dome of the left lobe which did not demonstrate active enhancement or washout.  The recommended AFP correlation and imaging with hepatic protocol MRI or CT.  Diffuse gastric wall thickening seen most pronounced at the gastric antrum they cannot relate gastric ulcer or lesion.  Apparent mild wall thickening of the sigmoid colon and rectum seen.  Diffuse bladder wall thickening also was noted.  Gallstones were seen.  Plan  1.  Needs MRI in sept relook liver and nodules and see howing and maybe next year the elastography. 2. Pt is on fuler ozempic 2.0 mg and closer to tghe essence trial dose and the hope is heping to get earlier tx with what be likely his main option for tx ad rezdiffra stage 4 but not with decomp and he has some. 3 Need to check labs that he did from [facility]. 4. Pt meghan redo the labs in sept. 5. Remidned hep b vaccine 6. Reminded re the egd. 7. Meld score.    Duration of visit was 45 minutes total with 15  minutes spent preparing chart, and loading information into [facility] with additional 30 minutes spent for this face to face visit with time spent reviewing patient's chart, notes, labs and discussing treatment plan with time spent discussing plans with pt.

## 2025-06-07 ENCOUNTER — LAB OUTSIDE AN ENCOUNTER (OUTPATIENT)
Dept: URBAN - METROPOLITAN AREA CLINIC 86 | Facility: CLINIC | Age: 65
End: 2025-06-07

## 2025-07-23 ENCOUNTER — OFFICE VISIT (OUTPATIENT)
Dept: URBAN - METROPOLITAN AREA CLINIC 12 | Facility: CLINIC | Age: 65
End: 2025-07-23
Payer: COMMERCIAL

## 2025-07-23 DIAGNOSIS — K76.6 PORTAL HYPERTENSION: ICD-10-CM

## 2025-07-23 DIAGNOSIS — K76.0 FATTY LIVER: ICD-10-CM

## 2025-07-23 DIAGNOSIS — I86.4 GASTRIC VARICES: ICD-10-CM

## 2025-07-23 DIAGNOSIS — Z95.5 H/O HEART ARTERY STENT: ICD-10-CM

## 2025-07-23 DIAGNOSIS — R16.1 SPLENOMEGALY: ICD-10-CM

## 2025-07-23 DIAGNOSIS — Z12.11 COLON CANCER SCREENING: ICD-10-CM

## 2025-07-23 DIAGNOSIS — D69.6 THROMBOCYTOPENIA: ICD-10-CM

## 2025-07-23 DIAGNOSIS — K74.60 HEPATIC CIRRHOSIS, UNSPECIFIED HEPATIC CIRRHOSIS TYPE, UNSPECIFIED WHETHER ASCITES PRESENT: ICD-10-CM

## 2025-07-23 PROBLEM — 91109007: Status: ACTIVE | Noted: 2025-07-23

## 2025-07-23 PROBLEM — 305058001: Status: ACTIVE | Noted: 2025-07-23

## 2025-07-23 PROBLEM — 19943007: Status: ACTIVE | Noted: 2025-07-23

## 2025-07-23 PROBLEM — 428375006: Status: ACTIVE | Noted: 2025-07-23

## 2025-07-23 PROCEDURE — 99214 OFFICE O/P EST MOD 30 MIN: CPT

## 2025-07-23 RX ORDER — SILDENAFIL 20 MG/1
1 TABLET TABLET, FILM COATED ORAL ONCE A DAY
Status: ACTIVE | COMMUNITY

## 2025-07-23 RX ORDER — LISINOPRIL AND HYDROCHLOROTHIAZIDE 20; 25 MG/1; MG/1
1 TABLET TABLET ORAL ONCE A DAY
Status: ACTIVE | COMMUNITY

## 2025-07-23 RX ORDER — NITROGLYCERIN 0.4 MG/1
1 TABLET UNDER THE TONGUE AND ALLOW TO DISSOLVE AS NEEDED. TAKE EVERY 5 MINUTES UP TO 3 TIMES IF CHEST PAIN PERSISTS TABLET SUBLINGUAL THREE TIMES A DAY
Status: ACTIVE | COMMUNITY

## 2025-07-23 RX ORDER — SEMAGLUTIDE 2.68 MG/ML
AS DIRECTED INJECTION, SOLUTION SUBCUTANEOUS
Status: ACTIVE | COMMUNITY

## 2025-07-23 RX ORDER — ASPIRIN 81 MG/1
1 TABLET TABLET, COATED ORAL ONCE A DAY
Status: ACTIVE | COMMUNITY

## 2025-07-23 RX ORDER — ROSUVASTATIN CALCIUM 20 MG/1
1 TABLET TABLET, COATED ORAL ONCE A DAY
Status: ACTIVE | COMMUNITY

## 2025-07-23 RX ORDER — METFORMIN HYDROCHLORIDE 500 MG/1
1 TABLET WITH A MEAL TABLET, FILM COATED ORAL ONCE A DAY
Status: ACTIVE | COMMUNITY

## 2025-07-23 NOTE — PHYSICAL EXAM CONSTITUTIONAL:
well developed, well nourished , in no acute distress , ambulating without difficulty , normal communication ability
Present (15 x15 bpm)

## 2025-07-23 NOTE — HPI-TODAY'S VISIT:
64 yo M presents today for an egd/colon consult.  Pt reports his last colon was around age 57. Unable to recall where it was done but states it was unremarkable except for hemorrhoids and anal fissure. Reports regular daily BMs with occasional constipation, which he attributes to Ozempic use. Denies abd pain or blood in stool. No FHx of CRC.  Pt is followed by Dr. Bonilla for fatty liver/possible cirrhosis. MR Elastrograophy from 3/2025 showed mean liver stiffness of 7.5 kPa, consistent with stage IV fibrosis/cirrhosis. Also noted: cirrhotic liver with stigmata of portal HTN and small perigastric varices. EGD was recommended for further eval. Hx of thrombocytopenia. MRI liver w/ and w/o contrast and f/u with Dr. Bonilla scheduled for Sept.  PMH includes CAD s/p stent in 2008. Pt reports he was cleared by cardiology and is followed by PCP. On daily ASA. Denies pulm or renal issues. No h/o anesthesia complications.

## 2025-08-05 ENCOUNTER — TELEPHONE ENCOUNTER (OUTPATIENT)
Dept: URBAN - METROPOLITAN AREA CLINIC 12 | Facility: CLINIC | Age: 65
End: 2025-08-05

## 2025-08-05 ENCOUNTER — TELEPHONE ENCOUNTER (OUTPATIENT)
Dept: URBAN - METROPOLITAN AREA CLINIC 86 | Facility: CLINIC | Age: 65
End: 2025-08-05

## 2025-08-11 ENCOUNTER — TELEPHONE ENCOUNTER (OUTPATIENT)
Dept: URBAN - METROPOLITAN AREA CLINIC 23 | Facility: CLINIC | Age: 65
End: 2025-08-11

## 2025-08-12 ENCOUNTER — TELEPHONE ENCOUNTER (OUTPATIENT)
Dept: URBAN - METROPOLITAN AREA CLINIC 12 | Facility: CLINIC | Age: 65
End: 2025-08-12